# Patient Record
Sex: MALE | Race: BLACK OR AFRICAN AMERICAN | Employment: UNEMPLOYED
[De-identification: names, ages, dates, MRNs, and addresses within clinical notes are randomized per-mention and may not be internally consistent; named-entity substitution may affect disease eponyms.]

---

## 2023-03-02 ENCOUNTER — APPOINTMENT (OUTPATIENT)
Facility: HOSPITAL | Age: 38
End: 2023-03-02

## 2023-03-02 ENCOUNTER — HOSPITAL ENCOUNTER (EMERGENCY)
Facility: HOSPITAL | Age: 38
Discharge: HOME | End: 2023-03-02
Attending: EMERGENCY MEDICINE

## 2023-03-02 VITALS
SYSTOLIC BLOOD PRESSURE: 124 MMHG | TEMPERATURE: 98 F | HEART RATE: 84 BPM | DIASTOLIC BLOOD PRESSURE: 100 MMHG | RESPIRATION RATE: 16 BRPM | BODY MASS INDEX: 30.8 KG/M2 | HEIGHT: 71 IN | WEIGHT: 220 LBS | OXYGEN SATURATION: 100 %

## 2023-03-02 DIAGNOSIS — S39.012A STRAIN OF LUMBAR REGION, INITIAL ENCOUNTER: Primary | ICD-10-CM

## 2023-03-02 DIAGNOSIS — I10 ELEVATED BLOOD PRESSURE READING IN OFFICE WITH DIAGNOSIS OF HYPERTENSION: ICD-10-CM

## 2023-03-02 PROCEDURE — 72110 X-RAY EXAM L-2 SPINE 4/>VWS: CPT

## 2023-03-02 PROCEDURE — 6370000000 HC RX 637 (ALT 250 FOR IP): Performed by: PHYSICIAN ASSISTANT

## 2023-03-02 PROCEDURE — 6360000002 HC RX W HCPCS: Performed by: PHYSICIAN ASSISTANT

## 2023-03-02 PROCEDURE — 99284 EMERGENCY DEPT VISIT MOD MDM: CPT

## 2023-03-02 PROCEDURE — 96372 THER/PROPH/DIAG INJ SC/IM: CPT

## 2023-03-02 RX ORDER — DEXAMETHASONE SODIUM PHOSPHATE 10 MG/ML
8 INJECTION, SOLUTION INTRAMUSCULAR; INTRAVENOUS ONCE
OUTPATIENT
Start: 2023-03-02

## 2023-03-02 RX ORDER — METHOCARBAMOL 750 MG/1
750 TABLET, FILM COATED ORAL 3 TIMES DAILY
Qty: 30 TABLET | Refills: 0 | Status: SHIPPED | OUTPATIENT
Start: 2023-03-02 | End: 2023-03-12

## 2023-03-02 RX ORDER — METHYLPREDNISOLONE 4 MG/1
TABLET ORAL
Qty: 1 KIT | Refills: 0 | Status: SHIPPED | OUTPATIENT
Start: 2023-03-02 | End: 2023-03-08

## 2023-03-02 RX ORDER — KETOROLAC TROMETHAMINE 15 MG/ML
30 INJECTION, SOLUTION INTRAMUSCULAR; INTRAVENOUS
Status: COMPLETED | OUTPATIENT
Start: 2023-03-02 | End: 2023-03-02

## 2023-03-02 RX ORDER — METHOCARBAMOL 750 MG/1
750 TABLET, FILM COATED ORAL
Status: COMPLETED | OUTPATIENT
Start: 2023-03-02 | End: 2023-03-02

## 2023-03-02 RX ORDER — NAPROXEN 500 MG/1
500 TABLET ORAL 2 TIMES DAILY WITH MEALS
Qty: 20 TABLET | Refills: 0 | Status: SHIPPED | OUTPATIENT
Start: 2023-03-02 | End: 2023-03-12

## 2023-03-02 RX ADMIN — METHOCARBAMOL 750 MG: 750 TABLET ORAL at 11:34

## 2023-03-02 RX ADMIN — KETOROLAC TROMETHAMINE 30 MG: 15 INJECTION, SOLUTION INTRAMUSCULAR; INTRAVENOUS at 11:34

## 2023-03-02 ASSESSMENT — LIFESTYLE VARIABLES
HOW OFTEN DO YOU HAVE A DRINK CONTAINING ALCOHOL: MONTHLY OR LESS
HOW MANY STANDARD DRINKS CONTAINING ALCOHOL DO YOU HAVE ON A TYPICAL DAY: 1 OR 2

## 2023-03-02 ASSESSMENT — PAIN DESCRIPTION - LOCATION
LOCATION: BACK
LOCATION: BACK

## 2023-03-02 ASSESSMENT — ENCOUNTER SYMPTOMS
RHINORRHEA: 0
SHORTNESS OF BREATH: 0
EYE REDNESS: 0
EYE DISCHARGE: 0
ABDOMINAL PAIN: 0
COUGH: 0
CONSTIPATION: 0
BACK PAIN: 1
WHEEZING: 0
SORE THROAT: 0
NAUSEA: 0
VOMITING: 0
DIARRHEA: 0

## 2023-03-02 ASSESSMENT — PAIN SCALES - GENERAL: PAINLEVEL_OUTOF10: 8

## 2023-03-02 ASSESSMENT — PAIN DESCRIPTION - ORIENTATION
ORIENTATION: MID;LOWER
ORIENTATION: LOWER

## 2023-03-02 ASSESSMENT — PAIN DESCRIPTION - DESCRIPTORS
DESCRIPTORS: ACHING
DESCRIPTORS: ACHING

## 2023-03-02 ASSESSMENT — PAIN - FUNCTIONAL ASSESSMENT: PAIN_FUNCTIONAL_ASSESSMENT: 0-10

## 2023-03-02 NOTE — ED PROVIDER NOTES
[facility] EMERGENCY DEPT  EMERGENCY DEPARTMENT ENCOUNTER      Pt Name: Josse Wilson  Pt Age: 44 y.o. Sex: male   MRN: [id number]  CSN: [id number]   Birthdate 1985  Date of evaluation: 3/2/2023  Provider: CARMEN Gastelum  Room: [address]  Time Dictated: 11:38 AM    Chief Complaint   Chief Complaint   Patient presents with    Back Pain       History of Present Illness   The history is provided by the patient. 10:53 AM  44 y.o. male presents to the ED C/O Back pain. 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 Since then he has been progressively getting worse. For the last 3-4 days it can barely get out of bed or into his vehicle because the pain is so bad. Every now and then he gets a tingling sensation do both of his arms down to his finger tips that lasts only a few seconds. His mother gave him 2 Flexeril last night which helped for a little while but then the pain was back when it wore off. He has been applying head also with some mild relief temporarily. No fecal/urinary incontinence, saddle paraesthesias, dysuria, frequency, urgency, hematuria, or weakness. Nursing Note reviewed    REVIEW OF SYSTEMS    (2-9 systems for level 4, 10 or more for level 5)   Review of Systems   Constitutional:  Negative for chills and fever. HENT:  Negative for congestion, rhinorrhea and sore throat. Eyes:  Negative for discharge and redness. Respiratory:  Negative for cough, shortness of breath and wheezing. Cardiovascular:  Negative for chest pain and palpitations. Gastrointestinal:  Negative for abdominal pain, constipation, diarrhea, nausea and vomiting. Genitourinary:  Negative for dysuria, frequency and urgency. Musculoskeletal:  Positive for back pain. Negative for myalgias and neck pain. Skin:  Negative for rash and wound. Neurological:  Negative for dizziness and headaches. Hematological:  Negative for adenopathy.    Psychiatric/Behavioral: Negative for agitation and confusion. PAST MEDICAL HISTORY     Past Medical History:   Diagnosis Date    Hypertension          SURGICAL HISTORY     History reviewed. No pertinent surgical history. CURRENT MEDICATIONS       Previous Medications    No medications on file       ALLERGIES     Patient has no known allergies. FAMILY HISTORY     History reviewed. No pertinent family history. SOCIAL HISTORY       Social History     Socioeconomic History    Marital status: Single     Spouse name: None    Number of children: None    Years of education: None    Highest education level: None   Tobacco Use    Smoking status: Every Day     Types: Cigarettes    Smokeless tobacco: Current   Substance and Sexual Activity    Alcohol use: Yes    Drug use: Never       SCREENINGS         [place] Coma Scale  Eye Opening: Spontaneous  Best Verbal Response: Oriented  Best Motor Response: Obeys commands  [place] Coma Scale Score: 15                     CIWA Assessment  BP: (!) 124/100 (pt in pain and rocking)  Heart Rate: 84                 PHYSICAL EXAM    (up to 7 for level 4, 8 or more for level 5)     ED Triage Vitals [03/02/23 1024]   BP Temp Temp Source Heart Rate Resp SpO2 Height Weight   (!) 159/116 98 °F (36.7 °C) Oral (!) 102 16 98 % 5' 11\" (1.803 m) 220 lb (99.8 kg)       Physical Exam  Vitals and nursing note reviewed. Constitutional:       General: He is not in acute distress. Appearance: Normal appearance. HENT:      Head: Normocephalic and atraumatic. Cardiovascular:      Rate and Rhythm: Normal rate and regular rhythm. Pulses: Normal pulses. Heart sounds: Normal heart sounds. Pulmonary:      Effort: Pulmonary effort is normal.      Breath sounds: Normal breath sounds. Abdominal:      General: Bowel sounds are normal.      Tenderness: There is no abdominal tenderness. There is no right CVA tenderness or left CVA tenderness. Musculoskeletal:         General: Tenderness present.  No swelling, deformity or signs of injury. Cervical back: Normal range of motion and neck supple. No tenderness. Right lower leg: No edema. Left lower leg: No edema. Comments: Paraspinous and L3-L5 spinous process region is TTP with FROM to neck, back, and all four exremities. Neg SLR, toe/heel lift, axial loading, pseudo rotation and light touch. Distal pulses, motor, and sensation are intact. Strength is 5/5. Skin:     Findings: No bruising, erythema or rash. Neurological:      General: No focal deficit present. Mental Status: He is alert and oriented to person, place, and time. Mental status is at baseline. Sensory: No sensory deficit. Motor: No weakness. Gait: Gait normal.   Psychiatric:         Mood and Affect: Mood normal.         Behavior: Behavior normal.       DIAGNOSTIC RESULTS     XR LUMBAR SPINE (MIN 4 VIEWS)    (Results Pending)       Interpretation per the Radiologist below, if available at the time of this note:    XR LUMBAR SPINE (MIN 4 VIEWS)    (Results Pending)       RADIOLOGY:   Non-plain film images such as CT, Ultrasound and MRI are read by the radiologist. Plain radiographic images are visualized and preliminarily interpreted by the emergency physician with the below findings:    LABS:  Labs Reviewed - No data to display    All other labs were within normal range or not returned as of this dictation. No results found for this or any previous visit (from the past 12 hour(s)).     MEDICATIONS GIVEN:  Medications   ketorolac (TORADOL) injection 30 mg (30 mg IntraMUSCular Given 3/2/23 1134)   methocarbamol (ROBAXIN) tablet 750 mg (750 mg Oral Given 3/2/23 1134)       PROCEDURES:  Unless otherwise noted below, none     Procedures    Impression / ED Course / Medical Decision Making   Vitals:    Vitals:    03/02/23 1024 03/02/23 1137   BP: (!) 159/116 (!) 124/100   Pulse: (!) 102 84   Resp: 16 16   Temp: 98 °F (36.7 °C)    TempSrc: Oral    SpO2: 98% 100% Weight: 220 lb (99.8 kg)    Height: 5' 11\" (1.803 m)      Medical Decision Making  Problems Addressed:  Elevated blood pressure reading in office with diagnosis of hypertension: acute illness or injury  Strain of lumbar region, initial encounter: acute illness or injury    Amount and/or Complexity of Data Reviewed  Radiology: ordered. Risk  OTC drugs. Prescription drug management. Ddx: Back pain: musculoskeletal, strain, sprain, renal colic, pyelonephritis, UTI, HNP, AAA, diskitis, sciatica, sacroillitis, radiculopathy      Impression: Lumbar strain    Management Plan: Evaluate and treat back pain. Patient was in agreement with discharge plan and patient was discharged in good condition. PROGRESS NOTE:   10:53 AM  Initial assessment completed. REASSESSMENT        CONSULTS:  None    DISCHARGE NOTE:  11:29 AM  Dinesh DELONG Melissa's  results have been reviewed with him. He has been counseled regarding his diagnosis, treatment, and plan. He verbally conveys understanding and agreement of the signs, symptoms, diagnosis, treatment and prognosis and additionally agrees to follow up as discussed. He also agrees with the care-plan and conveys that all of his questions have been answered. I have also provided discharge instructions for him that include: educational information regarding their diagnosis and treatment, and list of reasons why they would want to return to the ED prior to their follow-up appointment, should his condition change. FINAL IMPRESSION      1. Strain of lumbar region, initial encounter    2.  Elevated blood pressure reading in office with diagnosis of hypertension            DISPOSITION/PLAN   DISPOSITION Discharge - Pending Orders Complete 03/02/2023 11:35:37 AM       Medication List        START taking these medications      methocarbamol 750 MG tablet  Commonly known as: Robaxin-750  Take 1 tablet by mouth 3 times daily for 10 days     methylPREDNISolone 4 MG tablet  Commonly known as: MEDROL (ADELINA)  Take by mouth. Follow directions on the back of the blister pack. naproxen 500 MG tablet  Commonly known as: NAPROSYN  Take 1 tablet by mouth 2 times daily (with meals) for 10 days               Where to Get Your Medications        These medications were sent to [facility] [address]  [address], [address] [address]      Phone: [phone]   methocarbamol 750 MG tablet  methylPREDNISolone 4 MG tablet  naproxen 500 MG tablet          PATIENT REFERRED TO:  None None    Go in 1 week  If no imprvement for PT referral    DISCHARGE MEDICATIONS:  New Prescriptions    METHOCARBAMOL (ROBAXIN-750) 750 MG TABLET    Take 1 tablet by mouth 3 times daily for 10 days    METHYLPREDNISOLONE (MEDROL, ADELINA,) 4 MG TABLET    Take by mouth. Follow directions on the back of the blister pack. NAPROXEN (NAPROSYN) 500 MG TABLET    Take 1 tablet by mouth 2 times daily (with meals) for 10 days     Controlled Substances Monitoring:     No flowsheet data found. Nursing notes have been reviewed by the physician/advanced practice    Clinician.     Merged with [facility]  March 2, 2023          [place], [place]  03/02/23 [address] [facility],[address], [place]  03/02/23 [address] [place], [place]  03/02/23 [facility]

## 2023-03-02 NOTE — Clinical Note
Morteza Echavarria was seen and treated in our emergency department on 3/2/2023. He may return to work on 03/05/2023. Apply moist heat, expect soreness and do stretching exercises. Hold the Naprosyn and Medrol dose pack until tomorrow. Do not take the Robaxin again for another 8 hrs from now. If you have any questions or concerns, please don't hesitate to call.       CARMEN Prieto

## 2023-03-14 ENCOUNTER — HOSPITAL ENCOUNTER (EMERGENCY)
Facility: HOSPITAL | Age: 38
Discharge: HOME | End: 2023-03-15
Attending: EMERGENCY MEDICINE
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
## 2023-03-14 DIAGNOSIS — S39.012A STRAIN OF LUMBAR REGION, INITIAL ENCOUNTER: ICD-10-CM

## 2023-03-14 PROCEDURE — 99283 EMERGENCY DEPT VISIT LOW MDM: CPT

## 2023-03-14 ASSESSMENT — PAIN - FUNCTIONAL ASSESSMENT: PAIN_FUNCTIONAL_ASSESSMENT: 0-10

## 2023-03-14 ASSESSMENT — PAIN SCALES - GENERAL: PAINLEVEL_OUTOF10: 7

## 2023-03-15 ENCOUNTER — APPOINTMENT (OUTPATIENT)
Facility: HOSPITAL | Age: 38
End: 2023-03-15

## 2023-03-15 VITALS
OXYGEN SATURATION: 98 % | WEIGHT: 220 LBS | HEIGHT: 71 IN | BODY MASS INDEX: 30.8 KG/M2 | DIASTOLIC BLOOD PRESSURE: 93 MMHG | RESPIRATION RATE: 18 BRPM | HEART RATE: 79 BPM | SYSTOLIC BLOOD PRESSURE: 140 MMHG | TEMPERATURE: 98.5 F

## 2023-03-15 PROCEDURE — 72100 X-RAY EXAM L-S SPINE 2/3 VWS: CPT

## 2023-03-15 ASSESSMENT — ENCOUNTER SYMPTOMS
NAUSEA: 0
BACK PAIN: 1
ABDOMINAL PAIN: 0
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
## 2023-03-15 NOTE — ED NOTES
Discharge instructions reviewed with patient. Patient verbalized understanding. Patient declined work note. Patient ambulated from ED treatment area independently.       Pérez Wayne RN  03/15/23 [id number]

## 2023-03-15 NOTE — DISCHARGE INSTRUCTIONS
Return for pain, fever not resolving with motrin or tylenol, shortness of breath, vomiting, decreased fluid intake, weakness, numbness, dizziness, or any change or concerns.
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
